# Patient Record
Sex: FEMALE | Race: WHITE | NOT HISPANIC OR LATINO | Employment: UNEMPLOYED | URBAN - METROPOLITAN AREA
[De-identification: names, ages, dates, MRNs, and addresses within clinical notes are randomized per-mention and may not be internally consistent; named-entity substitution may affect disease eponyms.]

---

## 2020-03-17 ENCOUNTER — HOSPITAL ENCOUNTER (OUTPATIENT)
Dept: GENERAL RADIOLOGY | Facility: HOSPITAL | Age: 35
Discharge: HOME | End: 2020-03-17
Attending: OBSTETRICS & GYNECOLOGY

## 2021-03-01 LAB
BACTERIA SPEC AEROBE CULT: ABNORMAL
C TRACH RRNA SPEC DONR QL NAA+PROBE: NORMAL
EXTERNAL ABO GROUPING: NORMAL
EXTERNAL ANTIBODY SCREEN: NORMAL
EXTERNAL HEMATOCRIT: 37 %
EXTERNAL HEMOGLOBIN: 12.5 G/DL
EXTERNAL HEPATITIS B SURFACE ANTIGEN: NEGATIVE
EXTERNAL PLATELET COUNT: 336 K/ΜL
EXTERNAL RH FACTOR: POSITIVE
EXTERNAL SYPHILIS RPR SCREEN: NORMAL
HCV AB S/CO SERPL IA: NON REACTIVE
HIV 1+2 AB+HIV1 P24 AG SERPL QL IA: NORMAL
N GONORRHOEA DNA SPEC QL NAA+PROBE: NORMAL
RUBV IGG SERPL IA-ACNC: >500

## 2021-06-23 LAB
EXTERNAL HEMATOCRIT: 39 %
EXTERNAL HEMOGLOBIN: 12.7 G/DL
GLUCOSE 1H P 100 G GLC PO SERPL-MCNC: 118 MG/DL (ref 74–180)

## 2021-08-28 ENCOUNTER — HOSPITAL ENCOUNTER (OUTPATIENT)
Facility: HOSPITAL | Age: 36
Discharge: HOME | End: 2021-08-28
Attending: OBSTETRICS & GYNECOLOGY | Admitting: OBSTETRICS & GYNECOLOGY

## 2021-08-28 VITALS — SYSTOLIC BLOOD PRESSURE: 107 MMHG | RESPIRATION RATE: 20 BRPM | DIASTOLIC BLOOD PRESSURE: 51 MMHG

## 2021-08-28 PROCEDURE — 59025 FETAL NON-STRESS TEST: CPT

## 2021-09-01 ENCOUNTER — HOSPITAL ENCOUNTER (OUTPATIENT)
Facility: HOSPITAL | Age: 36
Discharge: HOME | End: 2021-09-01
Attending: OBSTETRICS & GYNECOLOGY | Admitting: OBSTETRICS & GYNECOLOGY

## 2021-09-01 VITALS
DIASTOLIC BLOOD PRESSURE: 74 MMHG | HEART RATE: 83 BPM | SYSTOLIC BLOOD PRESSURE: 128 MMHG | OXYGEN SATURATION: 99 % | RESPIRATION RATE: 18 BRPM | TEMPERATURE: 98.6 F

## 2021-09-01 PROCEDURE — 59025 FETAL NON-STRESS TEST: CPT

## 2021-09-01 NOTE — SIGNIFICANT NOTE
09/01/21 1545   Nonstress Test   Reason for NST Other (Comment)  (AMA)   Acoustic Stimulator No   Uterine Irritability Yes   Contractions Not present   Fetal Assessment   Fetal Movement active   Fetal HR Assessment Method external   Fetal HR (beats/min) 145   Fetal Heart Baseline Rate normal range   Fetal HR Variability moderate (amplitude range 6 to 25 bpm)   Fetal HR Accelerations episodic;greater than/equal to 15 bpm;lasting at least 15 seconds   Fetal HR Decelerations absent   Fetal HR Tracing Category Category I   Sinusoidal Pattern Present absent   Interpretation A   Nonstress Test Interpretation A Reactive   34w2d, NST for AMA  /74 (BP Location: Right arm, Patient Position: Sitting)   Pulse 83   Temp 98.6 °F (37 °C) (Oral)   Resp 18   SpO2 99%

## 2021-09-01 NOTE — NON STRESS TEST
OB SCHEDULED NST NOTE        Name:  Nevin Huerta  MRN: [id number]    36 y.o. female  at 34w2d    Indication: AMA    NST:  Baseline: Normal 110-160 bpm  Variability:   Moderate/Normal (amplitude 6-25 bpm)  Accelerations: Present (32 weeks+) 15 x 15 bpm  Decelerations: None  Contractions:  Irritability    Impression:  Category I     Plan: OB Precautions, FKC, Keep scheduled NSTs, Keep office visit      Electronically signed by Jeanie Rojas DO, 21, 5:12 PM EDT.

## 2021-09-04 ENCOUNTER — HOSPITAL ENCOUNTER (OUTPATIENT)
Facility: HOSPITAL | Age: 36
Discharge: HOME | End: 2021-09-04
Attending: OBSTETRICS & GYNECOLOGY | Admitting: OBSTETRICS & GYNECOLOGY

## 2021-09-04 ENCOUNTER — HOSPITAL ENCOUNTER (OUTPATIENT)
Dept: LABOR AND DELIVERY | Facility: HOSPITAL | Age: 36
Setting detail: SURGERY ADMIT
Discharge: HOME | End: 2021-09-04

## 2021-09-04 VITALS — RESPIRATION RATE: 18 BRPM | SYSTOLIC BLOOD PRESSURE: 120 MMHG | HEART RATE: 82 BPM | DIASTOLIC BLOOD PRESSURE: 63 MMHG

## 2021-09-04 PROCEDURE — 59025 FETAL NON-STRESS TEST: CPT

## 2021-09-04 PROCEDURE — 59025 FETAL NON-STRESS TEST: CPT | Performed by: OBSTETRICS & GYNECOLOGY

## 2021-09-04 NOTE — NURSING NOTE
Pt here for an NST due to AMA, NST is reactive but one variable noted with MD priscila at nurses station at this time and reviewed tracing, no concerns at this time, pt may discharge home

## 2021-09-04 NOTE — SIGNIFICANT NOTE
09/04/21 1254   Nonstress Test   Reason for NST Other (Comment)  (AMA)   Acoustic Stimulator No   Contractions Irregular  (Occasional)   Fetal Assessment   Fetal Movement active   Fetal HR Assessment Method external   Fetal HR (beats/min) 140   Fetal Heart Baseline Rate normal range   Fetal HR Variability moderate (amplitude range 6 to 25 bpm)   Fetal HR Accelerations greater than/equal to 15 bpm;lasting at least 15 seconds   Fetal HR Decelerations variable   Sinusoidal Pattern Present absent   Interpretation A   Nonstress Test Interpretation A Reactive   34w5d  /63 (BP Location: Right arm, Patient Position: Sitting)   Pulse 82   Resp 18

## 2021-09-04 NOTE — PROGRESS NOTES
Obstetrical Non-stress Test Interpretation     Name:  Nevin Huerta  MRN: [id number]    36 y.o. female  at 34w5d    Indication: Scheduled NST for AMA    Weeks Gestation: 34w5d       Baseline: 145 BPM  Variability:   Moderate/Normal (amplitude 6-25 bpm)  Accelerations: Present (32 weeks+) 15 x 15 bpm  Decelerations: Absent   Contractions:  Absent      Impression:  Reactive NST,    Plan: Discharge to home.  Keep follow up per office instructions.  Kick counts daily please      George Gloria Sr., MD  2021  12:36 EDT

## 2021-09-08 ENCOUNTER — HOSPITAL ENCOUNTER (OUTPATIENT)
Facility: HOSPITAL | Age: 36
Discharge: HOME | End: 2021-09-08
Attending: OBSTETRICS & GYNECOLOGY | Admitting: OBSTETRICS & GYNECOLOGY

## 2021-09-08 ENCOUNTER — HOSPITAL ENCOUNTER (OUTPATIENT)
Dept: LABOR AND DELIVERY | Facility: HOSPITAL | Age: 36
Discharge: HOME | End: 2021-09-08

## 2021-09-08 VITALS
TEMPERATURE: 98.3 F | DIASTOLIC BLOOD PRESSURE: 74 MMHG | HEART RATE: 86 BPM | BODY MASS INDEX: 42.52 KG/M2 | SYSTOLIC BLOOD PRESSURE: 108 MMHG | HEIGHT: 63 IN | RESPIRATION RATE: 16 BRPM | WEIGHT: 240 LBS

## 2021-09-08 PROCEDURE — 59025 FETAL NON-STRESS TEST: CPT

## 2021-09-08 RX ORDER — OMEPRAZOLE 20 MG/1
20 CAPSULE, DELAYED RELEASE ORAL DAILY
COMMUNITY
End: 2021-09-14 | Stop reason: SDUPTHER

## 2021-09-08 RX ORDER — ASPIRIN 81 MG/1
81 TABLET, CHEWABLE ORAL DAILY
Status: ON HOLD | COMMUNITY
End: 2021-09-22

## 2021-09-08 RX ORDER — PRENATAL VIT/IRON FUM/FOLIC AC 27MG-0.8MG
TABLET ORAL DAILY
COMMUNITY
End: 2021-12-17

## 2021-09-08 NOTE — SIGNIFICANT NOTE
"   09/08/21 1205   Nonstress Test   Reason for NST Other (Comment)  (nst ama)   Acoustic Stimulator No   Uterine Irritability No   Contractions Not present   Fetal Assessment   Fetal Movement active   Fetal HR Assessment Method external   Fetal HR (beats/min) 145   Fetal Heart Baseline Rate normal range   Fetal HR Variability moderate (amplitude range 6 to 25 bpm)   Fetal HR Accelerations greater than/equal to 15 bpm;lasting at least 15 seconds   Fetal HR Decelerations absent   Interpretation A   Nonstress Test Interpretation A Reactive   Patient Vitals for the past 24 hrs:   BP Temp Temp src Pulse Resp Height Weight   09/08/21 1212 108/74 -- -- 86 16 -- --   09/08/21 1140 -- 98.3 °F (36.8 °C) Oral -- -- 160 cm (63\") 109 kg (240 lb)    35w2d   "

## 2021-09-11 ENCOUNTER — HOSPITAL ENCOUNTER (OUTPATIENT)
Facility: HOSPITAL | Age: 36
Discharge: HOME | End: 2021-09-11
Attending: OBSTETRICS & GYNECOLOGY | Admitting: OBSTETRICS & GYNECOLOGY

## 2021-09-11 ENCOUNTER — HOSPITAL ENCOUNTER (OUTPATIENT)
Dept: LABOR AND DELIVERY | Facility: HOSPITAL | Age: 36
Setting detail: SURGERY ADMIT
Discharge: HOME | End: 2021-09-11

## 2021-09-11 VITALS
DIASTOLIC BLOOD PRESSURE: 64 MMHG | TEMPERATURE: 98.7 F | OXYGEN SATURATION: 99 % | SYSTOLIC BLOOD PRESSURE: 133 MMHG | HEART RATE: 82 BPM | RESPIRATION RATE: 18 BRPM

## 2021-09-11 PROCEDURE — 59025 FETAL NON-STRESS TEST: CPT

## 2021-09-11 NOTE — NON STRESS TEST
J[facility]ost   OB NST Note    2021   Name:  Nevin Huerta  MRN: [id number]    Subjective:  36 y.o.  at 35w5d    Indication: Elevated BMI    NST:   Baseline: 140  Variability:   Moderate/Normal (amplitude 6-25 bpm)  Accelerations: Present (32 weeks+) 15 x 15 bpm  Decelerations: Absent   Contractions:  Not regular    NST interpretation: reactive    There were no vitals filed for this visit.      Lab Results (last 24 hours)     ** No results found for the last 24 hours. **           Cervical Exam:    Assessment:  36 y.o.  AT 35w5d  AMA and Elevated BMI    Plan:   OB Precautions, FKC, Keep scheduled NSTs, Keep office visit      Electronically signed by Tim Stanton MD, 21, 1:01 PM EDT.

## 2021-09-14 ENCOUNTER — ROUTINE PRENATAL (OUTPATIENT)
Dept: OBSTETRICS AND GYNECOLOGY | Facility: CLINIC | Age: 36
End: 2021-09-14

## 2021-09-14 VITALS — WEIGHT: 249 LBS | BODY MASS INDEX: 44.11 KG/M2 | SYSTOLIC BLOOD PRESSURE: 118 MMHG | DIASTOLIC BLOOD PRESSURE: 74 MMHG

## 2021-09-14 DIAGNOSIS — Z98.891 PREVIOUS CESAREAN SECTION: ICD-10-CM

## 2021-09-14 DIAGNOSIS — Z3A.36 36 WEEKS GESTATION OF PREGNANCY: ICD-10-CM

## 2021-09-14 DIAGNOSIS — O09.93 HIGH-RISK PREGNANCY IN THIRD TRIMESTER: Primary | ICD-10-CM

## 2021-09-14 DIAGNOSIS — O99.210 OBESITY IN PREGNANCY, ANTEPARTUM: ICD-10-CM

## 2021-09-14 DIAGNOSIS — O09.523 MULTIGRAVIDA OF ADVANCED MATERNAL AGE IN THIRD TRIMESTER: ICD-10-CM

## 2021-09-14 LAB
GLUCOSE UR STRIP-MCNC: NEGATIVE MG/DL
PROT UR STRIP-MCNC: NEGATIVE MG/DL

## 2021-09-14 PROCEDURE — 99213 OFFICE O/P EST LOW 20 MIN: CPT | Performed by: OBSTETRICS & GYNECOLOGY

## 2021-09-14 PROCEDURE — 87081 CULTURE SCREEN ONLY: CPT | Performed by: OBSTETRICS & GYNECOLOGY

## 2021-09-14 RX ORDER — FOLIC ACID 1 MG/1
1 TABLET ORAL DAILY
COMMUNITY
End: 2021-12-17

## 2021-09-14 NOTE — PROGRESS NOTES
OB FOLLOW UP    CC: Scheduled OB routine FU      Prenatal care complicated by: Elevated BMI, AMA and Prior   Patient Active Problem List   Diagnosis   • 36 weeks gestation of pregnancy   • Multigravida of advanced maternal age in third trimester   • High-risk pregnancy in third trimester   • Previous  section   • Obesity in pregnancy, antepartum       Subjective:   Patient has: No leaking fluid, No vaginal bleeding, Adequate FM, No HA, No scotomata or vision changes, No RUQ/epigastric pain, No nausea, No vomiting, No back pain, No UTI symptoms  COMPLAINS OF: Cramping Occasionally and Swelling    Objective:  Urine glucose/protein- see flow sheet      /74   Wt 113 kg (249 lb)   LMP 2020 (Approximate)   BMI 44.11 kg/m²   See OB flow for LE edema, and cvx exam if applicable  FHT: 144 BPM   Uterine Size: 37 cm      Assessment and Plan:  Diagnoses and all orders for this visit:    1. High-risk pregnancy in third trimester (Primary)    2. 36 weeks gestation of pregnancy  Comments:  Anticipatory guidance  Labor precautions, fetal kick counting  Orders:  -     Group B Streptococcus Culture - Swab, Vaginal/Rectum  -     POC Urinalysis Dipstick    3. Multigravida of advanced maternal age in third trimester  Comments:  Continue nonstress tests  Delivery at 39th week of gestation    4. Previous  section  Comments:  Repeat  section scheduled, risk, benefits, alternatives discussed      5. Obesity in pregnancy, antepartum  Comments:  Monitor weight  Overview:  Monitor weight            36w1d  Reassuring pregnancy progress    Counseling: OB precautions, leaking, VB, august cardenas vs PTL/Labor, FKC, HTN precautions, HA, vision change, RUQ/epigastric pain, edema    Questions answered    Return in about 1 week (around 2021).      Tim Stanton MD  2021

## 2021-09-15 ENCOUNTER — HOSPITAL ENCOUNTER (OUTPATIENT)
Facility: HOSPITAL | Age: 36
Discharge: HOME | End: 2021-09-15
Attending: OBSTETRICS & GYNECOLOGY | Admitting: OBSTETRICS & GYNECOLOGY

## 2021-09-15 VITALS
DIASTOLIC BLOOD PRESSURE: 70 MMHG | RESPIRATION RATE: 18 BRPM | SYSTOLIC BLOOD PRESSURE: 118 MMHG | TEMPERATURE: 98.3 F | HEART RATE: 86 BPM

## 2021-09-15 PROCEDURE — 59025 FETAL NON-STRESS TEST: CPT

## 2021-09-15 NOTE — SIGNIFICANT NOTE
09/15/21 1644   Nonstress Test   Reason for NST Other (Comment)  (AMA)   Acoustic Stimulator No   Uterine Irritability No   Contractions Irregular   Fetal Assessment   Fetal Movement active   Fetal HR Assessment Method external   Fetal HR (beats/min) 145   Fetal Heart Baseline Rate normal range   Fetal HR Variability moderate (amplitude range 6 to 25 bpm)   Fetal HR Accelerations greater than/equal to 15 bpm;lasting at least 15 seconds   Fetal HR Decelerations absent   Fetal HR Tracing Category Category I   Sinusoidal Pattern Present absent   Interpretation A   Nonstress Test Interpretation A Reactive   36w2d  /70 (BP Location: Right arm, Patient Position: Lying)   Pulse 86   Temp 98.3 °F (36.8 °C) (Oral)   Resp 18   LMP 12/25/2020 (Approximate)

## 2021-09-15 NOTE — NON STRESS TEST
OB SCHEDULED NST NOTE        Name:  Nevin Huerta  MRN: [id number]    36 y.o. female  at 36w2d    Indication: AMA    NST:  Baseline: Normal 110-160 bpm  Variability:   Moderate/Normal (amplitude 6-25 bpm)  Accelerations: Present (32 weeks+) 15 x 15 bpm  Decelerations: None  Contractions:  Absent  Duration: see nursing documentation    Impression:  Category I     Plan: OB Precautions, FKC, Keep scheduled NSTs, Keep office visit      Electronically signed by Jeanie Rojas DO, 09/15/21, 6:56 PM EDT.

## 2021-09-16 LAB — BACTERIA SPEC AEROBE CULT: NORMAL

## 2021-09-18 ENCOUNTER — HOSPITAL ENCOUNTER (OUTPATIENT)
Dept: LABOR AND DELIVERY | Facility: HOSPITAL | Age: 36
Setting detail: SURGERY ADMIT
Discharge: HOME | End: 2021-09-18

## 2021-09-18 ENCOUNTER — HOSPITAL ENCOUNTER (OUTPATIENT)
Facility: HOSPITAL | Age: 36
Discharge: HOME | End: 2021-09-18
Attending: OBSTETRICS & GYNECOLOGY | Admitting: OBSTETRICS & GYNECOLOGY

## 2021-09-18 VITALS
RESPIRATION RATE: 18 BRPM | OXYGEN SATURATION: 98 % | SYSTOLIC BLOOD PRESSURE: 115 MMHG | HEART RATE: 79 BPM | DIASTOLIC BLOOD PRESSURE: 46 MMHG

## 2021-09-18 PROCEDURE — 59025 FETAL NON-STRESS TEST: CPT

## 2021-09-18 NOTE — SIGNIFICANT NOTE
36w5d  /46 (BP Location: Right arm, Patient Position: Sitting)   Pulse 79   Resp 18   LMP 12/25/2020 (Approximate)   SpO2 98%     Patient arrived to L&D triage for NST for AMA. Upon arrival to triage area patient slid in water on floor. No injuries noted. Patient slid to knees. Denies hitting belly, Is feeling fetal movement. Incident report completed.        09/18/21 1218   Nonstress Test   Reason for NST Other (Comment)  (AMA, PATIENT FELL ON ARRIVAL FOR NST IN TRIAGE ROOM.)   Acoustic Stimulator No   Uterine Irritability No   Contractions Not present   Fetal Assessment   Fetal Movement active   Fetal HR Assessment Method external   Fetal HR (beats/min) 140   Fetal Heart Baseline Rate normal range   Fetal HR Variability moderate (amplitude range 6 to 25 bpm)   Fetal HR Accelerations greater than/equal to 15 bpm;lasting at least 15 seconds   Fetal HR Decelerations variable   Interpretation A   Nonstress Test Interpretation A Reactive

## 2021-09-18 NOTE — NON STRESS TEST
[facility]   Obstetrical Non-Stress Test Interpretation    Patient Name: Nevin Huerta  : 1985  MRN: [id number]  Primary Care Physician:  Trinh Morse APRN  Date of admission: 2021      36 y.o. female  at 36w5d    Indication: AMA    Patient slipped in [facility] restroom when arriving for NST today and went down to her knees. Did not hit abdomen, denies any discomfort, and is feeling good fetal movement.      Baseline: Normal 110-160 bpm  Variability:   Moderate/Normal (amplitude 6-25 bpm)  Accelerations: Present (32 weeks+) 15 x 15 bpm  Decelerations: Present:  Variable decelerations, 1 isolated variable  Contractions:  Present       Impression:  Reactive      Valentina Bronson CNM  2021  12:22 EDT

## 2021-09-20 ENCOUNTER — HOSPITAL ENCOUNTER (OUTPATIENT)
Facility: HOSPITAL | Age: 36
Discharge: HOME | End: 2021-09-20
Attending: STUDENT IN AN ORGANIZED HEALTH CARE EDUCATION/TRAINING PROGRAM | Admitting: STUDENT IN AN ORGANIZED HEALTH CARE EDUCATION/TRAINING PROGRAM

## 2021-09-20 VITALS
RESPIRATION RATE: 18 BRPM | HEART RATE: 99 BPM | DIASTOLIC BLOOD PRESSURE: 59 MMHG | SYSTOLIC BLOOD PRESSURE: 114 MMHG | OXYGEN SATURATION: 98 % | TEMPERATURE: 98.1 F

## 2021-09-20 PROCEDURE — 59025 FETAL NON-STRESS TEST: CPT

## 2021-09-20 NOTE — SIGNIFICANT NOTE
37w0d  /59 (BP Location: Right arm, Patient Position: Sitting)   Pulse 99   Temp 98.1 °F (36.7 °C) (Oral)   Resp 18   LMP 12/25/2020 (Approximate)   SpO2 98%        09/20/21 1335   Nonstress Test   Reason for NST OB Triage;Decreased fetal movement   Fetal Assessment   Fetal Movement active   Fetal HR Assessment Method external   Fetal HR (beats/min) 150   Fetal Heart Baseline Rate normal range   Fetal HR Variability moderate (amplitude range 6 to 25 bpm)   Fetal HR Accelerations greater than/equal to 15 bpm;lasting at least 15 seconds   Interpretation A   Nonstress Test Interpretation A Reactive

## 2021-09-21 ENCOUNTER — ROUTINE PRENATAL (OUTPATIENT)
Dept: OBSTETRICS AND GYNECOLOGY | Facility: CLINIC | Age: 36
End: 2021-09-21

## 2021-09-21 VITALS — WEIGHT: 252 LBS | BODY MASS INDEX: 44.64 KG/M2 | DIASTOLIC BLOOD PRESSURE: 77 MMHG | SYSTOLIC BLOOD PRESSURE: 114 MMHG

## 2021-09-21 DIAGNOSIS — O99.210 OBESITY IN PREGNANCY, ANTEPARTUM: ICD-10-CM

## 2021-09-21 DIAGNOSIS — Z3A.37 37 WEEKS GESTATION OF PREGNANCY: ICD-10-CM

## 2021-09-21 DIAGNOSIS — O09.93 HIGH-RISK PREGNANCY IN THIRD TRIMESTER: Primary | ICD-10-CM

## 2021-09-21 DIAGNOSIS — Z30.09 UNWANTED FERTILITY: ICD-10-CM

## 2021-09-21 DIAGNOSIS — K21.9 GASTROESOPHAGEAL REFLUX DISEASE WITHOUT ESOPHAGITIS: ICD-10-CM

## 2021-09-21 DIAGNOSIS — O09.523 MULTIGRAVIDA OF ADVANCED MATERNAL AGE IN THIRD TRIMESTER: ICD-10-CM

## 2021-09-21 DIAGNOSIS — Z98.891 PREVIOUS CESAREAN SECTION: ICD-10-CM

## 2021-09-21 PROBLEM — O09.40 ENCOUNTER FOR SUPERVISION OF HIGH RISK PREGNANCY WITH GRAND MULTIPARITY, ANTEPARTUM: Status: ACTIVE | Noted: 2021-09-21

## 2021-09-21 PROBLEM — Z34.80 SUPERVISION OF OTHER NORMAL PREGNANCY, ANTEPARTUM: Status: ACTIVE | Noted: 2021-09-21

## 2021-09-21 LAB
GLUCOSE UR STRIP-MCNC: NEGATIVE MG/DL
PROT UR STRIP-MCNC: ABNORMAL MG/DL

## 2021-09-21 PROCEDURE — 99213 OFFICE O/P EST LOW 20 MIN: CPT | Performed by: OBSTETRICS & GYNECOLOGY

## 2021-09-21 NOTE — ASSESSMENT & PLAN NOTE
Risks, benefits, alternatives of repeat  delivery with bilateral tubal ligation discussed.  Patient wishes to proceed.  Surgery has been scheduled.

## 2021-09-21 NOTE — PROGRESS NOTES
OB FOLLOW UP    CC: Scheduled OB routine FU     Prenatal care complicated by: Elevated BMI- pre-pregnancy, AMA, Prior   Patient Active Problem List   Diagnosis   • 37 weeks gestation of pregnancy   • Multigravida of advanced maternal age in third trimester   • High-risk pregnancy in third trimester   • Previous  section   • Gastroesophageal reflux disease without esophagitis   • Unwanted fertility       Subjective:   Patient has: No complaints, No leaking fluid, No vaginal bleeding, Adequate FM, No HA, No scotomata or vision changes, No RUQ/epigastric pain, No nausea, No vomiting  Reports some contractions yesterday.  Only occasional ones today.  Does report some lower extremity swelling    Objective:  Urine glucose/protein- see flow sheet      /77   Wt 114 kg (252 lb)   LMP 2020 (LMP Unknown)   BMI 44.64 kg/m²   See OB flow for LE edema, and cvx exam if applicable  FHT: 140 BPM   Uterine Size: 38 cm      Assessment and Plan:  Diagnoses and all orders for this visit:    1. High-risk pregnancy in third trimester (Primary)  -     POC Urinalysis Dipstick    2. 37 weeks gestation of pregnancy    3. Previous  section  Overview:  Repeat  delivery on 10/4 with BTL    Assessment & Plan:  Risks, benefits, alternatives of repeat  delivery with bilateral tubal ligation discussed.  Patient wishes to proceed.  Surgery has been scheduled.      4. Obesity in pregnancy, antepartum  Overview:  Monitor weight      5. Multigravida of advanced maternal age in third trimester  Overview:  NSTs biweekly    Assessment & Plan:  Continue NSTs until delivery      6. Unwanted fertility  Overview:  Sterilization consent signed    Assessment & Plan:  Risks, benefits, alternatives of permanent sterilization reviewed.  Patient wishes to proceed      7. Gastroesophageal reflux disease without esophagitis  Overview:  Nexium 20 mg daily    Assessment & Plan:  Symptoms controlled.  Continue  Nexium      37w1d  Reassuring pregnancy progress    Counseling: OB precautions, leaking, VB, august cardenas vs PTL/Labor  [facility]    Questions answered    Return in about 1 week (around 9/28/2021) for Next scheduled follow up.      Tim Stanton MD  09/21/2021

## 2021-09-22 ENCOUNTER — HOSPITAL ENCOUNTER (OUTPATIENT)
Dept: LABOR AND DELIVERY | Facility: HOSPITAL | Age: 36
Setting detail: SURGERY ADMIT
Discharge: HOME | End: 2021-09-22

## 2021-09-22 ENCOUNTER — HOSPITAL ENCOUNTER (INPATIENT)
Facility: HOSPITAL | Age: 36
LOS: 2 days | Discharge: HOME | End: 2021-09-24
Attending: OBSTETRICS & GYNECOLOGY | Admitting: OBSTETRICS & GYNECOLOGY

## 2021-09-22 ENCOUNTER — ANESTHESIA EVENT (OUTPATIENT)
Dept: LABOR AND DELIVERY | Facility: HOSPITAL | Age: 36
End: 2021-09-22

## 2021-09-22 ENCOUNTER — ANESTHESIA (OUTPATIENT)
Dept: LABOR AND DELIVERY | Facility: HOSPITAL | Age: 36
End: 2021-09-22

## 2021-09-22 DIAGNOSIS — Z98.891 H/O: C-SECTION: Primary | ICD-10-CM

## 2021-09-22 PROBLEM — E66.01 MORBID OBESITY WITH BMI OF 40.0-44.9, ADULT: Status: ACTIVE | Noted: 2021-09-22

## 2021-09-22 PROBLEM — O36.8390 NON-REASSURING ELECTRONIC FETAL MONITORING TRACING: Status: ACTIVE | Noted: 2021-09-22

## 2021-09-22 LAB
ABO GROUP BLD: NORMAL
ABO GROUP BLD: NORMAL
BASE EXCESS BLDCOA CALC-SCNC: -3 MMOL/L
BASE EXCESS BLDCOV CALC-SCNC: -1.8 MMOL/L
BLD GP AB SCN SERPL QL: NEGATIVE
DEPRECATED RDW RBC AUTO: 43.6 FL (ref 37–54)
ERYTHROCYTE [DISTWIDTH] IN BLOOD BY AUTOMATED COUNT: 13.8 % (ref 12.3–15.4)
HCO3 BLDCOA-SCNC: 24.8 MMOL/L
HCO3 BLDCOV-SCNC: 24.9 MMOL/L
HCT VFR BLD AUTO: 37.4 % (ref 34–46.6)
HGB BLD-MCNC: 12.7 G/DL (ref 12–15.9)
MCH RBC QN AUTO: 29.5 PG (ref 26.6–33)
MCHC RBC AUTO-ENTMCNC: 34 G/DL (ref 31.5–35.7)
MCV RBC AUTO: 87 FL (ref 79–97)
PCO2 BLDCOA: 55.6 MMHG (ref 33–49)
PCO2 BLDCOV: 50.1 MM HG (ref 28–40)
PH BLDCOA: 7.27 PH UNITS (ref 7.21–7.31)
PH BLDCOV: 7.32 PH UNITS (ref 7.31–7.37)
PLATELET # BLD AUTO: 252 10*3/MM3 (ref 140–450)
PMV BLD AUTO: 11.1 FL (ref 6–12)
PO2 BLDCOA: <40.5 MMHG
PO2 BLDCOV: <40.5 MM HG (ref 21–31)
RBC # BLD AUTO: 4.3 10*6/MM3 (ref 3.77–5.28)
RH BLD: POSITIVE
RH BLD: POSITIVE
SARS-COV-2 N GENE RESP QL NAA+PROBE: NOT DETECTED
T&S EXPIRATION DATE: NORMAL
WBC # BLD AUTO: 8.33 10*3/MM3 (ref 3.4–10.8)

## 2021-09-22 PROCEDURE — C9803 HOPD COVID-19 SPEC COLLECT: HCPCS | Performed by: OBSTETRICS & GYNECOLOGY

## 2021-09-22 PROCEDURE — 87635 SARS-COV-2 COVID-19 AMP PRB: CPT | Performed by: OBSTETRICS & GYNECOLOGY

## 2021-09-22 PROCEDURE — 86900 BLOOD TYPING SEROLOGIC ABO: CPT | Performed by: OBSTETRICS & GYNECOLOGY

## 2021-09-22 PROCEDURE — 25010000002 METOCLOPRAMIDE PER 10 MG: Performed by: OBSTETRICS & GYNECOLOGY

## 2021-09-22 PROCEDURE — 86900 BLOOD TYPING SEROLOGIC ABO: CPT

## 2021-09-22 PROCEDURE — 86901 BLOOD TYPING SEROLOGIC RH(D): CPT

## 2021-09-22 PROCEDURE — 25010000003 CEFAZOLIN IN DEXTROSE 2-4 GM/100ML-% SOLUTION: Performed by: OBSTETRICS & GYNECOLOGY

## 2021-09-22 PROCEDURE — 86901 BLOOD TYPING SEROLOGIC RH(D): CPT | Performed by: OBSTETRICS & GYNECOLOGY

## 2021-09-22 PROCEDURE — 59515 CESAREAN DELIVERY: CPT | Performed by: OBSTETRICS & GYNECOLOGY

## 2021-09-22 PROCEDURE — C1889 IMPLANT/INSERT DEVICE, NOC: HCPCS | Performed by: OBSTETRICS & GYNECOLOGY

## 2021-09-22 PROCEDURE — 51702 INSERT TEMP BLADDER CATH: CPT

## 2021-09-22 PROCEDURE — 58611 LIGATE OVIDUCT(S) ADD-ON: CPT | Performed by: OBSTETRICS & GYNECOLOGY

## 2021-09-22 PROCEDURE — 25010000002 KETOROLAC TROMETHAMINE PER 15 MG: Performed by: NURSE ANESTHETIST, CERTIFIED REGISTERED

## 2021-09-22 PROCEDURE — 25010000002 KETOROLAC TROMETHAMINE PER 15 MG: Performed by: OBSTETRICS & GYNECOLOGY

## 2021-09-22 PROCEDURE — 85027 COMPLETE CBC AUTOMATED: CPT | Performed by: OBSTETRICS & GYNECOLOGY

## 2021-09-22 PROCEDURE — 0UL70CZ OCCLUSION OF BILATERAL FALLOPIAN TUBES WITH EXTRALUMINAL DEVICE, OPEN APPROACH: ICD-10-PCS | Performed by: OBSTETRICS & GYNECOLOGY

## 2021-09-22 PROCEDURE — 82803 BLOOD GASES ANY COMBINATION: CPT | Performed by: OBSTETRICS & GYNECOLOGY

## 2021-09-22 PROCEDURE — 25010000002 ONDANSETRON PER 1 MG: Performed by: NURSE ANESTHETIST, CERTIFIED REGISTERED

## 2021-09-22 PROCEDURE — 86850 RBC ANTIBODY SCREEN: CPT | Performed by: OBSTETRICS & GYNECOLOGY

## 2021-09-22 PROCEDURE — 25010000003 MORPHINE PER 10 MG: Performed by: ANESTHESIOLOGY

## 2021-09-22 DEVICE — CLIP FALLOP FILSHIE TI PR STRL BX/20: Type: IMPLANTABLE DEVICE | Status: FUNCTIONAL

## 2021-09-22 RX ORDER — SODIUM CHLORIDE 0.9 % (FLUSH) 0.9 %
3 SYRINGE (ML) INJECTION EVERY 12 HOURS SCHEDULED
Status: DISCONTINUED | OUTPATIENT
Start: 2021-09-22 | End: 2021-09-22

## 2021-09-22 RX ORDER — CARBOPROST TROMETHAMINE 250 UG/ML
250 INJECTION, SOLUTION INTRAMUSCULAR AS NEEDED
Status: DISCONTINUED | OUTPATIENT
Start: 2021-09-22 | End: 2021-09-22

## 2021-09-22 RX ORDER — METHYLERGONOVINE MALEATE 0.2 MG/ML
200 INJECTION INTRAVENOUS ONCE AS NEEDED
Status: DISCONTINUED | OUTPATIENT
Start: 2021-09-22 | End: 2021-09-22

## 2021-09-22 RX ORDER — ONDANSETRON 2 MG/ML
4 INJECTION INTRAMUSCULAR; INTRAVENOUS EVERY 6 HOURS PRN
Status: DISCONTINUED | OUTPATIENT
Start: 2021-09-22 | End: 2021-09-24 | Stop reason: HOSPADM

## 2021-09-22 RX ORDER — ONDANSETRON 2 MG/ML
INJECTION INTRAMUSCULAR; INTRAVENOUS AS NEEDED
Status: DISCONTINUED | OUTPATIENT
Start: 2021-09-22 | End: 2021-09-22 | Stop reason: SURG

## 2021-09-22 RX ORDER — BUPIVACAINE HYDROCHLORIDE 7.5 MG/ML
INJECTION, SOLUTION EPIDURAL; RETROBULBAR
Status: COMPLETED | OUTPATIENT
Start: 2021-09-22 | End: 2021-09-22

## 2021-09-22 RX ORDER — HYDROCORTISONE 25 MG/G
CREAM TOPICAL 3 TIMES DAILY PRN
Status: DISCONTINUED | OUTPATIENT
Start: 2021-09-22 | End: 2021-09-24 | Stop reason: HOSPADM

## 2021-09-22 RX ORDER — ONDANSETRON 4 MG/1
4 TABLET, FILM COATED ORAL EVERY 6 HOURS PRN
Status: DISCONTINUED | OUTPATIENT
Start: 2021-09-22 | End: 2021-09-22 | Stop reason: HOSPADM

## 2021-09-22 RX ORDER — IBUPROFEN 800 MG/1
800 TABLET ORAL EVERY 8 HOURS
Status: DISCONTINUED | OUTPATIENT
Start: 2021-09-24 | End: 2021-09-24 | Stop reason: HOSPADM

## 2021-09-22 RX ORDER — CEFAZOLIN SODIUM IN 0.9 % NACL 3 G/100 ML
3 INTRAVENOUS SOLUTION, PIGGYBACK (ML) INTRAVENOUS ONCE AS NEEDED
Status: DISCONTINUED | OUTPATIENT
Start: 2021-09-22 | End: 2021-09-22

## 2021-09-22 RX ORDER — PROMETHAZINE HYDROCHLORIDE 25 MG/1
25 TABLET ORAL ONCE AS NEEDED
Status: DISCONTINUED | OUTPATIENT
Start: 2021-09-22 | End: 2021-09-24 | Stop reason: HOSPADM

## 2021-09-22 RX ORDER — PROMETHAZINE HYDROCHLORIDE 25 MG/1
25 SUPPOSITORY RECTAL ONCE AS NEEDED
Status: DISCONTINUED | OUTPATIENT
Start: 2021-09-22 | End: 2021-09-24 | Stop reason: HOSPADM

## 2021-09-22 RX ORDER — FAMOTIDINE 10 MG/ML
20 INJECTION, SOLUTION INTRAVENOUS ONCE AS NEEDED
Status: COMPLETED | OUTPATIENT
Start: 2021-09-22 | End: 2021-09-22

## 2021-09-22 RX ORDER — ONDANSETRON 4 MG/1
4 TABLET, FILM COATED ORAL EVERY 6 HOURS PRN
Status: DISCONTINUED | OUTPATIENT
Start: 2021-09-22 | End: 2021-09-24 | Stop reason: HOSPADM

## 2021-09-22 RX ORDER — FAMOTIDINE 10 MG/ML
20 INJECTION, SOLUTION INTRAVENOUS ONCE AS NEEDED
Status: DISCONTINUED | OUTPATIENT
Start: 2021-09-22 | End: 2021-09-22 | Stop reason: HOSPADM

## 2021-09-22 RX ORDER — DOCUSATE SODIUM 100 MG/1
100 CAPSULE, LIQUID FILLED ORAL DAILY
Status: DISCONTINUED | OUTPATIENT
Start: 2021-09-22 | End: 2021-09-24 | Stop reason: HOSPADM

## 2021-09-22 RX ORDER — MISOPROSTOL 200 UG/1
800 TABLET ORAL AS NEEDED
Status: DISCONTINUED | OUTPATIENT
Start: 2021-09-22 | End: 2021-09-22

## 2021-09-22 RX ORDER — SODIUM CHLORIDE 0.9 % (FLUSH) 0.9 %
10 SYRINGE (ML) INJECTION AS NEEDED
Status: DISCONTINUED | OUTPATIENT
Start: 2021-09-22 | End: 2021-09-22

## 2021-09-22 RX ORDER — TRISODIUM CITRATE DIHYDRATE AND CITRIC ACID MONOHYDRATE 500; 334 MG/5ML; MG/5ML
30 SOLUTION ORAL ONCE
Status: COMPLETED | OUTPATIENT
Start: 2021-09-22 | End: 2021-09-22

## 2021-09-22 RX ORDER — MISOPROSTOL 200 UG/1
TABLET ORAL AS NEEDED
Status: DISCONTINUED | OUTPATIENT
Start: 2021-09-22 | End: 2021-09-24 | Stop reason: HOSPADM

## 2021-09-22 RX ORDER — OXYTOCIN 10 [USP'U]/ML
INJECTION, SOLUTION INTRAMUSCULAR; INTRAVENOUS AS NEEDED
Status: DISCONTINUED | OUTPATIENT
Start: 2021-09-22 | End: 2021-09-22 | Stop reason: SURG

## 2021-09-22 RX ORDER — KETOROLAC TROMETHAMINE 30 MG/ML
INJECTION, SOLUTION INTRAMUSCULAR; INTRAVENOUS AS NEEDED
Status: DISCONTINUED | OUTPATIENT
Start: 2021-09-22 | End: 2021-09-22 | Stop reason: SURG

## 2021-09-22 RX ORDER — LIDOCAINE HYDROCHLORIDE 10 MG/ML
5 INJECTION, SOLUTION EPIDURAL; INFILTRATION; INTRACAUDAL; PERINEURAL AS NEEDED
Status: DISCONTINUED | OUTPATIENT
Start: 2021-09-22 | End: 2021-09-22

## 2021-09-22 RX ORDER — OXYTOCIN-SODIUM CHLORIDE 0.9% IV SOLN 30 UNIT/500ML 30-0.9/5 UT/ML-%
125 SOLUTION INTRAVENOUS ONCE
Status: DISCONTINUED | OUTPATIENT
Start: 2021-09-22 | End: 2021-09-22 | Stop reason: HOSPADM

## 2021-09-22 RX ORDER — FAMOTIDINE 20 MG/1
20 TABLET, FILM COATED ORAL ONCE AS NEEDED
Status: DISCONTINUED | OUTPATIENT
Start: 2021-09-22 | End: 2021-09-22 | Stop reason: HOSPADM

## 2021-09-22 RX ORDER — MEPERIDINE HYDROCHLORIDE 25 MG/ML
12.5 INJECTION INTRAMUSCULAR; INTRAVENOUS; SUBCUTANEOUS
Status: ACTIVE | OUTPATIENT
Start: 2021-09-22 | End: 2021-09-23

## 2021-09-22 RX ORDER — OXYCODONE HYDROCHLORIDE 5 MG/1
5 TABLET ORAL
Status: DISCONTINUED | OUTPATIENT
Start: 2021-09-22 | End: 2021-09-24 | Stop reason: HOSPADM

## 2021-09-22 RX ORDER — METOCLOPRAMIDE HYDROCHLORIDE 5 MG/ML
10 INJECTION INTRAMUSCULAR; INTRAVENOUS ONCE AS NEEDED
Status: COMPLETED | OUTPATIENT
Start: 2021-09-22 | End: 2021-09-22

## 2021-09-22 RX ORDER — CEFAZOLIN SODIUM 2 G/100ML
2 INJECTION, SOLUTION INTRAVENOUS ONCE
Status: COMPLETED | OUTPATIENT
Start: 2021-09-22 | End: 2021-09-22

## 2021-09-22 RX ORDER — HYDROCODONE BITARTRATE AND ACETAMINOPHEN 10; 325 MG/1; MG/1
1 TABLET ORAL EVERY 4 HOURS PRN
Status: DISCONTINUED | OUTPATIENT
Start: 2021-09-23 | End: 2021-09-24 | Stop reason: HOSPADM

## 2021-09-22 RX ORDER — HYDROMORPHONE HCL 110MG/55ML
0.5 PATIENT CONTROLLED ANALGESIA SYRINGE INTRAVENOUS
Status: DISCONTINUED | OUTPATIENT
Start: 2021-09-22 | End: 2021-09-24 | Stop reason: HOSPADM

## 2021-09-22 RX ORDER — MISOPROSTOL 200 UG/1
200 TABLET ORAL AS NEEDED
Status: DISCONTINUED | OUTPATIENT
Start: 2021-09-22 | End: 2021-09-24 | Stop reason: HOSPADM

## 2021-09-22 RX ORDER — SIMETHICONE 80 MG
80 TABLET,CHEWABLE ORAL 4 TIMES DAILY PRN
Status: DISCONTINUED | OUTPATIENT
Start: 2021-09-22 | End: 2021-09-24 | Stop reason: HOSPADM

## 2021-09-22 RX ORDER — ACETAMINOPHEN 325 MG/1
650 TABLET ORAL EVERY 6 HOURS
Status: DISCONTINUED | OUTPATIENT
Start: 2021-09-22 | End: 2021-09-24 | Stop reason: HOSPADM

## 2021-09-22 RX ORDER — EPHEDRINE SULFATE 50 MG/ML
INJECTION, SOLUTION INTRAVENOUS AS NEEDED
Status: DISCONTINUED | OUTPATIENT
Start: 2021-09-22 | End: 2021-09-22 | Stop reason: SURG

## 2021-09-22 RX ORDER — HYDROMORPHONE HCL 110MG/55ML
0.25 PATIENT CONTROLLED ANALGESIA SYRINGE INTRAVENOUS
Status: DISCONTINUED | OUTPATIENT
Start: 2021-09-22 | End: 2021-09-24 | Stop reason: HOSPADM

## 2021-09-22 RX ORDER — SODIUM CHLORIDE, SODIUM LACTATE, POTASSIUM CHLORIDE, CALCIUM CHLORIDE 600; 310; 30; 20 MG/100ML; MG/100ML; MG/100ML; MG/100ML
150 INJECTION, SOLUTION INTRAVENOUS CONTINUOUS
Status: DISCONTINUED | OUTPATIENT
Start: 2021-09-22 | End: 2021-09-22

## 2021-09-22 RX ORDER — KETOROLAC TROMETHAMINE 30 MG/ML
30 INJECTION, SOLUTION INTRAMUSCULAR; INTRAVENOUS EVERY 6 HOURS
Status: COMPLETED | OUTPATIENT
Start: 2021-09-22 | End: 2021-09-23

## 2021-09-22 RX ORDER — METHYLERGONOVINE MALEATE 0.2 MG/ML
200 INJECTION INTRAVENOUS ONCE AS NEEDED
Status: DISCONTINUED | OUTPATIENT
Start: 2021-09-22 | End: 2021-09-24 | Stop reason: HOSPADM

## 2021-09-22 RX ORDER — SODIUM CHLORIDE, SODIUM LACTATE, POTASSIUM CHLORIDE, CALCIUM CHLORIDE 600; 310; 30; 20 MG/100ML; MG/100ML; MG/100ML; MG/100ML
INJECTION, SOLUTION INTRAVENOUS CONTINUOUS PRN
Status: DISCONTINUED | OUTPATIENT
Start: 2021-09-22 | End: 2021-09-22 | Stop reason: SURG

## 2021-09-22 RX ORDER — MORPHINE SULFATE 0.5 MG/ML
INJECTION, SOLUTION EPIDURAL; INTRATHECAL; INTRAVENOUS
Status: COMPLETED | OUTPATIENT
Start: 2021-09-22 | End: 2021-09-22

## 2021-09-22 RX ORDER — ONDANSETRON 2 MG/ML
4 INJECTION INTRAMUSCULAR; INTRAVENOUS ONCE AS NEEDED
Status: DISCONTINUED | OUTPATIENT
Start: 2021-09-22 | End: 2021-09-24 | Stop reason: HOSPADM

## 2021-09-22 RX ORDER — SODIUM CHLORIDE, SODIUM LACTATE, POTASSIUM CHLORIDE, CALCIUM CHLORIDE 600; 310; 30; 20 MG/100ML; MG/100ML; MG/100ML; MG/100ML
150 INJECTION, SOLUTION INTRAVENOUS CONTINUOUS
Status: DISCONTINUED | OUTPATIENT
Start: 2021-09-22 | End: 2021-09-24 | Stop reason: HOSPADM

## 2021-09-22 RX ORDER — CARBOPROST TROMETHAMINE 250 UG/ML
250 INJECTION, SOLUTION INTRAMUSCULAR AS NEEDED
Status: DISCONTINUED | OUTPATIENT
Start: 2021-09-22 | End: 2021-09-24 | Stop reason: HOSPADM

## 2021-09-22 RX ORDER — ONDANSETRON 2 MG/ML
4 INJECTION INTRAMUSCULAR; INTRAVENOUS EVERY 6 HOURS PRN
Status: DISCONTINUED | OUTPATIENT
Start: 2021-09-22 | End: 2021-09-22 | Stop reason: HOSPADM

## 2021-09-22 RX ORDER — HYDROCODONE BITARTRATE AND ACETAMINOPHEN 5; 325 MG/1; MG/1
1 TABLET ORAL EVERY 4 HOURS PRN
Status: DISCONTINUED | OUTPATIENT
Start: 2021-09-23 | End: 2021-09-24 | Stop reason: HOSPADM

## 2021-09-22 RX ADMIN — KETOROLAC TROMETHAMINE 30 MG: 30 INJECTION, SOLUTION INTRAMUSCULAR; INTRAVENOUS at 18:27

## 2021-09-22 RX ADMIN — DOCUSATE SODIUM 100 MG: 100 CAPSULE, LIQUID FILLED ORAL at 20:10

## 2021-09-22 RX ADMIN — SODIUM CHLORIDE, POTASSIUM CHLORIDE, SODIUM LACTATE AND CALCIUM CHLORIDE 1000 ML: 600; 310; 30; 20 INJECTION, SOLUTION INTRAVENOUS at 22:43

## 2021-09-22 RX ADMIN — METOCLOPRAMIDE HYDROCHLORIDE 10 MG: 5 INJECTION INTRAMUSCULAR; INTRAVENOUS at 15:01

## 2021-09-22 RX ADMIN — CEFAZOLIN SODIUM 2 G: 2 INJECTION, SOLUTION INTRAVENOUS at 14:59

## 2021-09-22 RX ADMIN — FAMOTIDINE 20 MG: 10 INJECTION INTRAVENOUS at 15:01

## 2021-09-22 RX ADMIN — SODIUM CHLORIDE, POTASSIUM CHLORIDE, SODIUM LACTATE AND CALCIUM CHLORIDE: 600; 310; 30; 20 INJECTION, SOLUTION INTRAVENOUS at 15:40

## 2021-09-22 RX ADMIN — ACETAMINOPHEN 650 MG: 325 TABLET, FILM COATED ORAL at 20:09

## 2021-09-22 RX ADMIN — OXYTOCIN 20 UNITS: 10 INJECTION INTRAVENOUS at 16:27

## 2021-09-22 RX ADMIN — BUPIVACAINE HYDROCHLORIDE 1.6 ML: 7.5 INJECTION, SOLUTION EPIDURAL; RETROBULBAR at 15:56

## 2021-09-22 RX ADMIN — SODIUM CHLORIDE, POTASSIUM CHLORIDE, SODIUM LACTATE AND CALCIUM CHLORIDE: 600; 310; 30; 20 INJECTION, SOLUTION INTRAVENOUS at 16:57

## 2021-09-22 RX ADMIN — SODIUM CHLORIDE, POTASSIUM CHLORIDE, SODIUM LACTATE AND CALCIUM CHLORIDE 150 ML/HR: 600; 310; 30; 20 INJECTION, SOLUTION INTRAVENOUS at 20:10

## 2021-09-22 RX ADMIN — EPHEDRINE SULFATE 25 MG: 50 INJECTION INTRAVENOUS at 16:03

## 2021-09-22 RX ADMIN — SODIUM CITRATE AND CITRIC ACID MONOHYDRATE 30 ML: 500; 334 SOLUTION ORAL at 15:01

## 2021-09-22 RX ADMIN — MORPHINE SULFATE 0.25 MG: 0.5 INJECTION, SOLUTION EPIDURAL; INTRATHECAL; INTRAVENOUS at 15:56

## 2021-09-22 RX ADMIN — EPHEDRINE SULFATE 10 MG: 50 INJECTION INTRAVENOUS at 16:21

## 2021-09-22 RX ADMIN — ONDANSETRON 4 MG: 2 INJECTION INTRAMUSCULAR; INTRAVENOUS at 16:29

## 2021-09-22 RX ADMIN — KETOROLAC TROMETHAMINE 30 MG: 30 INJECTION, SOLUTION INTRAMUSCULAR; INTRAVENOUS at 16:27

## 2021-09-22 NOTE — ANESTHESIA PREPROCEDURE EVALUATION
Anesthesia Evaluation     Patient summary reviewed and Nursing notes reviewed                Airway   Mallampati: I  TM distance: >3 FB  Neck ROM: full  No difficulty expected  Dental      Pulmonary - negative pulmonary ROS and normal exam    breath sounds clear to auscultation  Cardiovascular - negative cardio ROS and normal exam    Rhythm: regular        Neuro/Psych- negative ROS  GI/Hepatic/Renal/Endo    (+) morbid obesity, GERD,      Musculoskeletal (-) negative ROS    Abdominal    Substance History - negative use     OB/GYN    (+) Pregnant,         Other                        Anesthesia Plan    ASA 3     spinal     intravenous induction     Anesthetic plan, all risks, benefits, and alternatives have been provided, discussed and informed consent has been obtained with: patient.

## 2021-09-22 NOTE — ANESTHESIA PROCEDURE NOTES
Spinal Block    Pre-sedation assessment completed: 9/22/2021 3:56 PM    Patient reassessed immediately prior to procedure    Patient location during procedure: OB  Start Time: 9/22/2021 3:46 PM  Stop Time: 9/22/2021 3:56 PM  Performed By  Anesthesiologist: Dmitriy Dela Cruz MD  Preanesthetic Checklist  Completed: patient identified, IV checked, site marked, risks and benefits discussed, surgical consent, monitors and equipment checked, pre-op evaluation and timeout performed  Spinal Block Prep:  Patient Position:sitting  Sterile Tech:cap, gloves, gown, mask and sterile barriers  Prep:Chloraprep  Patient Monitoring:blood pressure monitoring, continuous pulse oximetry and EKG  Spinal Block Procedure  Approach:midline  Location:L3-L4  Needle Type:Della  Needle Gauge:25 G  Placement of Spinal needle event:cerebrospinal fluid aspirated  Paresthesia: no  Fluid Appearance:clear  Medications: morphine PF (DURAMORPH) injection, 0.25 mg  bupivacaine PF (MARCAINE) injection 0.75%, 1.6 mL   Post Assessment  Patient Tolerance:patient tolerated the procedure well with no apparent complications  Complications no

## 2021-09-23 LAB
BASOPHILS # BLD AUTO: 0.02 10*3/MM3 (ref 0–0.2)
BASOPHILS NFR BLD AUTO: 0.2 % (ref 0–1.5)
DEPRECATED RDW RBC AUTO: 44.5 FL (ref 37–54)
EOSINOPHIL # BLD AUTO: 0.01 10*3/MM3 (ref 0–0.4)
EOSINOPHIL NFR BLD AUTO: 0.1 % (ref 0.3–6.2)
ERYTHROCYTE [DISTWIDTH] IN BLOOD BY AUTOMATED COUNT: 13.9 % (ref 12.3–15.4)
HCT VFR BLD AUTO: 30.1 % (ref 34–46.6)
HGB BLD-MCNC: 10.4 G/DL (ref 12–15.9)
IMM GRANULOCYTES # BLD AUTO: 0.05 10*3/MM3 (ref 0–0.05)
IMM GRANULOCYTES NFR BLD AUTO: 0.6 % (ref 0–0.5)
LYMPHOCYTES # BLD AUTO: 1.05 10*3/MM3 (ref 0.7–3.1)
LYMPHOCYTES NFR BLD AUTO: 12.7 % (ref 19.6–45.3)
MCH RBC QN AUTO: 30.3 PG (ref 26.6–33)
MCHC RBC AUTO-ENTMCNC: 34.6 G/DL (ref 31.5–35.7)
MCV RBC AUTO: 87.8 FL (ref 79–97)
MONOCYTES # BLD AUTO: 0.46 10*3/MM3 (ref 0.1–0.9)
MONOCYTES NFR BLD AUTO: 5.6 % (ref 5–12)
NEUTROPHILS NFR BLD AUTO: 6.66 10*3/MM3 (ref 1.7–7)
NEUTROPHILS NFR BLD AUTO: 80.8 % (ref 42.7–76)
NRBC BLD AUTO-RTO: 0 /100 WBC (ref 0–0.2)
PLATELET # BLD AUTO: 184 10*3/MM3 (ref 140–450)
PMV BLD AUTO: 10.5 FL (ref 6–12)
RBC # BLD AUTO: 3.43 10*6/MM3 (ref 3.77–5.28)
WBC # BLD AUTO: 8.25 10*3/MM3 (ref 3.4–10.8)

## 2021-09-23 PROCEDURE — 25010000002 KETOROLAC TROMETHAMINE PER 15 MG: Performed by: OBSTETRICS & GYNECOLOGY

## 2021-09-23 PROCEDURE — 85025 COMPLETE CBC W/AUTO DIFF WBC: CPT | Performed by: OBSTETRICS & GYNECOLOGY

## 2021-09-23 RX ORDER — DIPHENHYDRAMINE HCL 25 MG
25 CAPSULE ORAL EVERY 6 HOURS PRN
Status: DISCONTINUED | OUTPATIENT
Start: 2021-09-23 | End: 2021-09-24 | Stop reason: HOSPADM

## 2021-09-23 RX ORDER — FAMOTIDINE 20 MG/1
20 TABLET, FILM COATED ORAL 2 TIMES DAILY PRN
Status: DISCONTINUED | OUTPATIENT
Start: 2021-09-23 | End: 2021-09-24 | Stop reason: HOSPADM

## 2021-09-23 RX ADMIN — ACETAMINOPHEN 650 MG: 325 TABLET, FILM COATED ORAL at 20:40

## 2021-09-23 RX ADMIN — ACETAMINOPHEN 650 MG: 325 TABLET, FILM COATED ORAL at 02:58

## 2021-09-23 RX ADMIN — KETOROLAC TROMETHAMINE 30 MG: 30 INJECTION, SOLUTION INTRAMUSCULAR; INTRAVENOUS at 06:31

## 2021-09-23 RX ADMIN — ACETAMINOPHEN 650 MG: 325 TABLET, FILM COATED ORAL at 14:56

## 2021-09-23 RX ADMIN — SIMETHICONE 80 MG: 80 TABLET, CHEWABLE ORAL at 20:41

## 2021-09-23 RX ADMIN — IBUPROFEN 800 MG: 800 TABLET, FILM COATED ORAL at 23:29

## 2021-09-23 RX ADMIN — FAMOTIDINE 20 MG: 20 TABLET ORAL at 09:33

## 2021-09-23 RX ADMIN — DOCUSATE SODIUM 100 MG: 100 CAPSULE, LIQUID FILLED ORAL at 08:20

## 2021-09-23 RX ADMIN — KETOROLAC TROMETHAMINE 30 MG: 30 INJECTION, SOLUTION INTRAMUSCULAR; INTRAVENOUS at 01:20

## 2021-09-23 RX ADMIN — KETOROLAC TROMETHAMINE 30 MG: 30 INJECTION, SOLUTION INTRAMUSCULAR; INTRAVENOUS at 13:17

## 2021-09-23 RX ADMIN — HYDROCODONE BITARTRATE AND ACETAMINOPHEN 1 TABLET: 10; 325 TABLET ORAL at 21:33

## 2021-09-23 RX ADMIN — ACETAMINOPHEN 650 MG: 325 TABLET, FILM COATED ORAL at 08:20

## 2021-09-23 RX ADMIN — SODIUM CHLORIDE, POTASSIUM CHLORIDE, SODIUM LACTATE AND CALCIUM CHLORIDE 150 ML/HR: 600; 310; 30; 20 INJECTION, SOLUTION INTRAVENOUS at 06:30

## 2021-09-23 NOTE — ANESTHESIA POSTPROCEDURE EVALUATION
Patient: Nevin Huerta    Procedure Summary     Date: 21 Room / Location: [facility] LABOR DELIV[facility]  [facility] LAB[facility]    Anesthesia Start:  Anesthesia Stop:     Procedure:  SECTION REPEAT WITH TUBAL (Bilateral Abdomen) Diagnosis:     Surgeons: Jeanie Rojas DO Provider: Dmitriy Dela Cruz MD    Anesthesia Type: spinal ASA Status: 3          Anesthesia Type: spinal    Vitals  Vitals Value Taken Time   /55 21 1500   Temp 37 °C (98.6 °F) 21 1500   Pulse 88 21 1500   Resp 18 21 1500   SpO2             Post Anesthesia Care and Evaluation    Patient location during evaluation: bedside  Patient participation: complete - patient participated  Level of consciousness: awake  Pain score: 0  Pain management: adequate  Airway patency: patent  Anesthetic complications: No anesthetic complications  PONV Status: none  Cardiovascular status: acceptable and stable  Respiratory status: acceptable and room air  Hydration status: acceptable  Post Neuraxial Block status: Motor and sensory function returned to baseline and No signs or symptoms of PDPH

## 2021-09-24 VITALS
RESPIRATION RATE: 18 BRPM | WEIGHT: 252 LBS | HEIGHT: 63 IN | BODY MASS INDEX: 44.65 KG/M2 | TEMPERATURE: 97.5 F | HEART RATE: 80 BPM | SYSTOLIC BLOOD PRESSURE: 122 MMHG | DIASTOLIC BLOOD PRESSURE: 68 MMHG | OXYGEN SATURATION: 98 %

## 2021-09-24 RX ORDER — IBUPROFEN 800 MG/1
800 TABLET ORAL EVERY 8 HOURS PRN
Qty: 30 TABLET | Refills: 1 | Status: SHIPPED | OUTPATIENT
Start: 2021-09-24 | End: 2021-10-04

## 2021-09-24 RX ORDER — HYDROCODONE BITARTRATE AND ACETAMINOPHEN 5; 325 MG/1; MG/1
1-2 TABLET ORAL EVERY 6 HOURS PRN
Qty: 20 TABLET | Refills: 0 | Status: SHIPPED | OUTPATIENT
Start: 2021-09-24 | End: 2021-10-04

## 2021-09-24 RX ORDER — ACETAMINOPHEN 325 MG/1
650 TABLET ORAL EVERY 6 HOURS PRN
Qty: 30 TABLET | Refills: 1 | Status: SHIPPED | OUTPATIENT
Start: 2021-09-24 | End: 2021-10-04

## 2021-09-24 RX ADMIN — SIMETHICONE 80 MG: 80 TABLET, CHEWABLE ORAL at 09:11

## 2021-09-24 RX ADMIN — ACETAMINOPHEN 650 MG: 325 TABLET, FILM COATED ORAL at 04:19

## 2021-09-24 RX ADMIN — HYDROCODONE BITARTRATE AND ACETAMINOPHEN 1 TABLET: 10; 325 TABLET ORAL at 15:11

## 2021-09-24 RX ADMIN — HYDROCODONE BITARTRATE AND ACETAMINOPHEN 1 TABLET: 10; 325 TABLET ORAL at 08:55

## 2021-09-24 RX ADMIN — IBUPROFEN 800 MG: 800 TABLET, FILM COATED ORAL at 15:11

## 2021-09-24 RX ADMIN — DOCUSATE SODIUM 100 MG: 100 CAPSULE, LIQUID FILLED ORAL at 08:55

## 2021-09-24 RX ADMIN — HYDROCODONE BITARTRATE AND ACETAMINOPHEN 1 TABLET: 5; 325 TABLET ORAL at 04:26

## 2021-09-24 RX ADMIN — IBUPROFEN 800 MG: 800 TABLET, FILM COATED ORAL at 08:55

## 2021-10-27 PROBLEM — Z30.09 UNWANTED FERTILITY: Status: RESOLVED | Noted: 2021-09-21 | Resolved: 2021-10-27

## 2021-10-27 PROBLEM — K21.9 GASTROESOPHAGEAL REFLUX DISEASE WITHOUT ESOPHAGITIS: Status: RESOLVED | Noted: 2021-09-21 | Resolved: 2021-10-27

## 2021-10-29 ENCOUNTER — POSTPARTUM VISIT (OUTPATIENT)
Dept: OBSTETRICS AND GYNECOLOGY | Facility: CLINIC | Age: 36
End: 2021-10-29

## 2021-10-29 VITALS — BODY MASS INDEX: 40.74 KG/M2 | WEIGHT: 230 LBS | DIASTOLIC BLOOD PRESSURE: 80 MMHG | SYSTOLIC BLOOD PRESSURE: 118 MMHG

## 2021-10-29 PROCEDURE — 0503F POSTPARTUM CARE VISIT: CPT | Performed by: OBSTETRICS & GYNECOLOGY

## 2021-10-29 NOTE — PROGRESS NOTES
POSTPARTUM Follow Up Visit    CC: Postpartum FU  Chief Complaint   Patient presents with   • Postpartum Care       HPI:      • Antepartum or Postpartum complications: Elevated BMI- pre-pregnancy, Prior   • Date of delivery: 2021  • Delivery type:   BTL [place]          Perineum: NA  • Delivering Provider:   Dr. Jeanie Rojas      • Feeding: Bottle  • Pain:  No  • Vaginal Bleeding:  Yes spotting  • EPDS score: 10  • Plans for BC:  Tubal ligation PERFORMED AT [facility]  • Weight at last OB OV:  252  Last Completed Pap Smear          PAP SMEAR (Every 3 Years) Next due on 2020  Done              PHYSICAL EXAM:  /80   Wt 104 kg (230 lb)   LMP 2020 (LMP Unknown)   BMI 40.74 kg/m²  19.5 kg (43 lb)  General- NAD, alert and oriented, appropriate  Psych- Normal mood, good memory    INCISION: Clean, dry, intact, no evidence of infection  Abdomen- Soft, non distended, non tender, no masses    External genitalia- Normal.    Urethra/Bladder/Vagina- Normal, no masses, non-tender, no prolapse     Cvx- Normal, no lesions, no discharge, no CMT  Uterus- Normal size, shape & consistency.  Non tender, mobile, & no prolapse  Adnexa- Normal, no mass, non-tender    Lymphatic- No palpable groin nodes  Ext- No edema, no cyanosis   Skin- No lesions, no rashes, no acanthosis nigricans      ASSESSMENT AND PLAN:  Diagnoses and all orders for this visit:    1. Postpartum follow-up (Primary)        Counseling:    • May resume intercourse  • May resume normal activities  • Core strengthening exercises reviewed and recommended  • Kegel exercises reviewed and recommended  • Ok to return to work/school    Follow Up:  Return in about 8 months (around 2022) for WWE.          Jeanie Rojas, DO  10/29/2021    [facility]  [address] [place] [address] [address] [place] [address]  Dept: [phone]  Dept Fax: [phone]  Loc: [phone]  Loc Fax: [phone]

## 2022-03-02 ENCOUNTER — OFFICE VISIT (OUTPATIENT)
Dept: OBSTETRICS AND GYNECOLOGY | Facility: CLINIC | Age: 37
End: 2022-03-02

## 2022-03-02 VITALS
BODY MASS INDEX: 38.32 KG/M2 | HEART RATE: 85 BPM | WEIGHT: 230 LBS | SYSTOLIC BLOOD PRESSURE: 127 MMHG | DIASTOLIC BLOOD PRESSURE: 81 MMHG | HEIGHT: 65 IN

## 2022-03-02 DIAGNOSIS — Z80.3 FAMILY HISTORY OF BREAST CANCER: ICD-10-CM

## 2022-03-02 DIAGNOSIS — Z01.419 WELL WOMAN EXAM: Primary | ICD-10-CM

## 2022-03-02 DIAGNOSIS — E66.01 MORBID OBESITY WITH BMI OF 40.0-44.9, ADULT: ICD-10-CM

## 2022-03-02 PROBLEM — O36.8390 NON-REASSURING ELECTRONIC FETAL MONITORING TRACING: Status: RESOLVED | Noted: 2021-09-22 | Resolved: 2022-03-02

## 2022-03-02 PROCEDURE — G0123 SCREEN CERV/VAG THIN LAYER: HCPCS | Performed by: OBSTETRICS & GYNECOLOGY

## 2022-03-02 PROCEDURE — 99395 PREV VISIT EST AGE 18-39: CPT | Performed by: OBSTETRICS & GYNECOLOGY

## 2022-03-02 NOTE — PROGRESS NOTES
"Well Woman Visit    CC: WWE       HPI:   36 y.o. menses has been regular. Still awaiting menses this month. Baby is doing well. Not breastfeeding.  No concerns.    History: PMHx, Meds, Allergies, PSHx, Social Hx, and POBHx all reviewed and updated.    /81   Pulse 85   Ht 165.1 cm (65\")   Wt 104 kg (230 lb)   LMP 2022 (Exact Date)   Breastfeeding No Comment: Tubal  BMI 38.27 kg/m²     Physical Exam  Vitals and nursing note reviewed. Exam conducted with a chaperone present.   Constitutional:       General: She is not in acute distress.     Appearance: Normal appearance. She is not ill-appearing.   HENT:      Head: Normocephalic and atraumatic.      Mouth/Throat:      Mouth: Mucous membranes are moist.      Pharynx: Oropharynx is clear.   Eyes:      Extraocular Movements: Extraocular movements intact.   Neck:      Thyroid: No thyroid mass or thyromegaly.   Cardiovascular:      Rate and Rhythm: Regular rhythm.      Heart sounds: No murmur heard.      Pulmonary:      Effort: Pulmonary effort is normal.      Breath sounds: No wheezing.   Chest:   Breasts:      Right: No mass, nipple discharge or tenderness.      Left: No mass, nipple discharge or tenderness.       Abdominal:      General: Abdomen is flat. There is no distension.      Palpations: Abdomen is soft. There is no mass.      Tenderness: There is no abdominal tenderness. There is no guarding or rebound.      Hernia: No hernia is present. There is no hernia in the left inguinal area or right inguinal area.   Genitourinary:     General: Normal vulva.      Exam position: Lithotomy position.      Pubic Area: No rash.       Labia:         Right: No rash, tenderness, lesion or injury.         Left: No rash, tenderness, lesion or injury.       Urethra: No prolapse, urethral pain, urethral swelling or urethral lesion.      Vagina: Normal. No signs of injury and foreign body. No vaginal discharge, erythema, tenderness, bleeding, lesions or " prolapsed vaginal walls.      Cervix: Normal.      Uterus: Normal.       Adnexa: Right adnexa normal and left adnexa normal.   Musculoskeletal:      Right lower leg: No edema.      Left lower leg: No edema.   Skin:     General: Skin is warm and dry.      Findings: No rash.   Neurological:      Mental Status: She is alert and oriented to person, place, and time.   Psychiatric:         Mood and Affect: Mood normal.         Behavior: Behavior normal.         Thought Content: Thought content normal.         Judgment: Judgment normal.         ASSESSMENT AND PLAN:    Diagnoses and all orders for this visit:    1. Well woman exam (Primary)  Assessment & Plan:  Recommend daily multivitamin folic acid  The patient has had sterilization for birth control    Orders:  -     IGP,rfx Aptima HPV All Pth    2. Family history of breast cancer  Overview:  Mother diagnosed at age 45  Myriad genetic testing negative for patient    Orders:  -     Mammo Screening Digital Tomosynthesis Bilateral With CAD; Future    3. Morbid obesity with BMI of 40.0-44.9, adult (HCC)  Assessment & Plan:  Discussed exercise, nutrition recommended weight loss.      Preventative:   MMG  Myriad: Done and negative  Recommend FLU vaccine this season, R/B discussed  Recommend COVID vaccine, R/B discussed    She understands the importance of having any ordered tests to be performed in a timely fashion.  The risks of not performing them include, but are not limited to, advanced cancer stages, bone loss from osteoporosis and/or subsequent increase in morbidity and/or mortality.  She is encouraged to review her results online and/or contact or office if she has questions.     Follow Up:  Return for Annual physical.    Tim Stanton MD  03/02/2022

## 2022-03-07 LAB
CONV .: NORMAL
CYTOLOGIST CVX/VAG CYTO: NORMAL
CYTOLOGY CVX/VAG DOC CYTO: NORMAL
CYTOLOGY CVX/VAG DOC THIN PREP: NORMAL
DX ICD CODE: NORMAL
HIV 1 & 2 AB SER-IMP: NORMAL
OTHER STN SPEC: NORMAL
STAT OF ADQ CVX/VAG CYTO-IMP: NORMAL

## 2022-05-02 ENCOUNTER — HOSPITAL ENCOUNTER (OUTPATIENT)
Dept: MAMMOGRAPHY | Facility: HOSPITAL | Age: 37
Discharge: HOME | End: 2022-05-02
Admitting: OBSTETRICS & GYNECOLOGY

## 2022-05-02 DIAGNOSIS — Z80.3 FAMILY HISTORY OF BREAST CANCER: ICD-10-CM

## 2022-05-02 PROCEDURE — 77063 BREAST TOMOSYNTHESIS BI: CPT

## 2022-05-02 PROCEDURE — 77067 SCR MAMMO BI INCL CAD: CPT

## 2023-03-08 ENCOUNTER — OFFICE VISIT (OUTPATIENT)
Dept: OBSTETRICS AND GYNECOLOGY | Facility: CLINIC | Age: 38
End: 2023-03-08
Payer: COMMERCIAL

## 2023-03-08 VITALS
BODY MASS INDEX: 35.65 KG/M2 | HEIGHT: 65 IN | WEIGHT: 214 LBS | SYSTOLIC BLOOD PRESSURE: 116 MMHG | HEART RATE: 94 BPM | DIASTOLIC BLOOD PRESSURE: 69 MMHG

## 2023-03-08 DIAGNOSIS — Z01.419 WELL WOMAN EXAM: ICD-10-CM

## 2023-03-08 DIAGNOSIS — Z01.419 WOMEN'S ANNUAL ROUTINE GYNECOLOGICAL EXAMINATION: Primary | ICD-10-CM

## 2023-03-08 DIAGNOSIS — Z80.3 FAMILY HISTORY OF BREAST CANCER: ICD-10-CM

## 2023-03-08 PROBLEM — E66.01 MORBID OBESITY WITH BMI OF 40.0-44.9, ADULT (HCC): Status: RESOLVED | Noted: 2021-09-22 | Resolved: 2023-03-08

## 2023-03-08 PROBLEM — Z98.891 H/O: C-SECTION: Status: RESOLVED | Noted: 2021-09-22 | Resolved: 2023-03-08

## 2023-03-08 PROCEDURE — G0123 SCREEN CERV/VAG THIN LAYER: HCPCS | Performed by: OBSTETRICS & GYNECOLOGY

## 2023-03-08 PROCEDURE — 99395 PREV VISIT EST AGE 18-39: CPT | Performed by: OBSTETRICS & GYNECOLOGY

## 2023-03-08 NOTE — PROGRESS NOTES
"Well Woman Visit    CC: CAROLINA    HPI:   37 y.o. who presents for a well woman exam.  The patient reports that her last 2 cycles of been abnormal.  She reports having a very heavy and longer flow in January.  She then skipped February.  She has not had a menstrual cycle in March yet.  No other problems or concerns.    History: PMHx, Meds, Allergies, PSHx, Social Hx, and POBHx all reviewed and updated.    /69   Pulse 94   Ht 165.1 cm (65\")   Wt 97.1 kg (214 lb)   LMP 2022   Breastfeeding No   BMI 35.61 kg/m²     Physical Exam  Vitals and nursing note reviewed. Exam conducted with a chaperone present.   Constitutional:       General: She is not in acute distress.     Appearance: Normal appearance. She is obese. She is not ill-appearing.   Neck:      Thyroid: No thyroid mass or thyromegaly.   Chest:   Breasts:     Breasts are symmetrical.      Right: Normal. No swelling, bleeding, inverted nipple, mass, nipple discharge, skin change or tenderness.      Left: Normal. No swelling, bleeding, inverted nipple, mass, nipple discharge, skin change or tenderness.   Abdominal:      General: Abdomen is flat. There is no distension.      Palpations: Abdomen is soft. There is no mass.      Tenderness: There is no abdominal tenderness. There is no guarding or rebound.      Hernia: No hernia is present. There is no hernia in the left inguinal area or right inguinal area.   Genitourinary:     General: Normal vulva.      Exam position: Lithotomy position.      Pubic Area: No rash.       Labia:         Right: No rash, tenderness, lesion or injury.         Left: No rash, tenderness, lesion or injury.       Urethra: No prolapse, urethral pain, urethral swelling or urethral lesion.      Vagina: Normal. No vaginal discharge, tenderness or prolapsed vaginal walls.      Cervix: Normal.      Uterus: Normal.       Adnexa: Right adnexa normal and left adnexa normal.   Lymphadenopathy:      Upper Body:      Right upper " body: No supraclavicular or axillary adenopathy.      Left upper body: No supraclavicular or axillary adenopathy.   Skin:     General: Skin is warm and dry.   Neurological:      Mental Status: She is alert and oriented to person, place, and time.   Psychiatric:         Mood and Affect: Mood normal.         Behavior: Behavior normal.         Thought Content: Thought content normal.         ASSESSMENT AND PLAN:    Diagnoses and all orders for this visit:    1. Women's annual routine gynecological examination (Primary)  -     IGP,rfx Aptima HPV All Pth  -     Mammo Screening Bilateral With CAD; Future    2. Well woman exam  Assessment & Plan:  Pap  Recommend daily multivitamin with folic acid      3. Family history of breast cancer  Overview:  Mother diagnosed at age 45  Myriad genetic testing negative for patient    Assessment & Plan:  Continue with yearly screening mammograms        Counseling:     Track menses, RTO IF <q21d, >7d long, or heavy    Preventative:   MMG  Recommend FLU vaccine this season, R/B discussed  Recommend COVID vaccine, R/B discussed    She understands the importance of having any ordered tests to be performed in a timely fashion.  The risks of not performing them include, but are not limited to, advanced cancer stages, bone loss from osteoporosis and/or subsequent increase in morbidity and/or mortality.  She is encouraged to review her results online and/or contact or office if she has questions.     Follow Up:  Return for Annual physical.    Tim Stanton MD  03/08/2023

## 2023-06-14 ENCOUNTER — HOSPITAL ENCOUNTER (OUTPATIENT)
Dept: MAMMOGRAPHY | Facility: HOSPITAL | Age: 38
Discharge: HOME | End: 2023-06-14
Admitting: OBSTETRICS & GYNECOLOGY
Payer: COMMERCIAL

## 2023-06-14 DIAGNOSIS — Z01.419 WOMEN'S ANNUAL ROUTINE GYNECOLOGICAL EXAMINATION: ICD-10-CM

## 2023-06-14 PROCEDURE — 77067 SCR MAMMO BI INCL CAD: CPT

## 2023-06-14 PROCEDURE — 77063 BREAST TOMOSYNTHESIS BI: CPT

## 2024-03-13 ENCOUNTER — OFFICE VISIT (OUTPATIENT)
Dept: OBSTETRICS AND GYNECOLOGY | Facility: CLINIC | Age: 39
End: 2024-03-13
Payer: COMMERCIAL

## 2024-03-13 VITALS
HEART RATE: 77 BPM | BODY MASS INDEX: 41.22 KG/M2 | DIASTOLIC BLOOD PRESSURE: 64 MMHG | WEIGHT: 224 LBS | HEIGHT: 62 IN | SYSTOLIC BLOOD PRESSURE: 115 MMHG

## 2024-03-13 DIAGNOSIS — Z80.3 FAMILY HISTORY OF BREAST CANCER: ICD-10-CM

## 2024-03-13 DIAGNOSIS — Z01.419 WOMEN'S ANNUAL ROUTINE GYNECOLOGICAL EXAMINATION: Primary | ICD-10-CM

## 2024-03-13 PROCEDURE — 99395 PREV VISIT EST AGE 18-39: CPT | Performed by: OBSTETRICS & GYNECOLOGY

## 2024-03-13 PROCEDURE — G0123 SCREEN CERV/VAG THIN LAYER: HCPCS | Performed by: OBSTETRICS & GYNECOLOGY

## 2024-03-13 NOTE — PROGRESS NOTES
"Well Woman Visit    CC: CAROLINA     HPI:   38 y.o. who presents for a well woman exam. Menses is monthly, lasting 5-7 days. Heavier flow. No concerns today    History: PMHx, Meds, Allergies, PSHx, Social Hx, and POBHx all reviewed and updated.    /64   Pulse 77   Ht 157.5 cm (62\")   Wt 102 kg (224 lb)   LMP 2024 (Exact Date)   BMI 40.97 kg/m²     Physical Exam  Vitals and nursing note reviewed. Exam conducted with a chaperone present.   Constitutional:       General: She is not in acute distress.     Appearance: Normal appearance. She is obese. She is not ill-appearing.   Neck:      Thyroid: No thyroid mass or thyromegaly.   Chest:   Breasts:     Breasts are symmetrical.      Right: Normal. No swelling, bleeding, inverted nipple, mass, nipple discharge, skin change or tenderness.      Left: Normal. No swelling, bleeding, inverted nipple, mass, nipple discharge, skin change or tenderness.   Abdominal:      General: Abdomen is flat. There is no distension.      Palpations: Abdomen is soft. There is no mass.      Tenderness: There is no abdominal tenderness.      Hernia: There is no hernia in the left inguinal area or right inguinal area.   Genitourinary:     General: Normal vulva.      Exam position: Lithotomy position.      Pubic Area: No rash.       Labia:         Right: No rash, tenderness, lesion or injury.         Left: No rash, tenderness, lesion or injury.       Urethra: No prolapse, urethral pain or urethral lesion.      Vagina: Normal. No vaginal discharge, tenderness or prolapsed vaginal walls.      Cervix: No friability, lesion, erythema or cervical bleeding.      Uterus: Normal. Not deviated, not enlarged, not fixed and not tender.       Adnexa:         Right: No mass or tenderness.          Left: No mass or tenderness.     Musculoskeletal:      Right lower leg: No edema.      Left lower leg: No edema.   Lymphadenopathy:      Upper Body:      Right upper body: No supraclavicular or " axillary adenopathy.      Left upper body: No supraclavicular or axillary adenopathy.   Skin:     General: Skin is warm and dry.   Neurological:      Mental Status: She is alert and oriented to person, place, and time.   Psychiatric:         Mood and Affect: Mood normal.         Behavior: Behavior normal.         Thought Content: Thought content normal.         ASSESSMENT AND PLAN:    Diagnoses and all orders for this visit:    1. Women's annual routine gynecological examination (Primary)  Assessment & Plan:  Pap  Recommend daily multivitamin with folic acid    Orders:  -     IGP,rfx Aptima HPV All Pth    2. Family history of breast cancer  Overview:  Mother diagnosed at age 45  Myriad genetic testing negative for patient    Orders:  -     Mammo Screening Digital Tomosynthesis Bilateral With CAD; Future        Counseling:     Track menses, RTO IF <q21d, >7d long, or heavy    Preventative:   MMG  Recommend FLU vaccine this season, R/B discussed  Recommend COVID vaccine, R/B discussed    She understands the importance of having any ordered tests to be performed in a timely fashion.  The risks of not performing them include, but are not limited to, advanced cancer stages, bone loss from osteoporosis and/or subsequent increase in morbidity and/or mortality.  She is encouraged to review her results online and/or contact or office if she has questions.     Follow Up:  Return for Annual physical.    Tim Stanton MD  03/13/2024

## 2024-03-18 LAB
CONV .: NORMAL
CYTOLOGIST CVX/VAG CYTO: NORMAL
CYTOLOGY CVX/VAG DOC CYTO: NORMAL
CYTOLOGY CVX/VAG DOC THIN PREP: NORMAL
DX ICD CODE: NORMAL
Lab: NORMAL
OTHER STN SPEC: NORMAL
STAT OF ADQ CVX/VAG CYTO-IMP: NORMAL

## 2024-06-12 NOTE — SIGNIFICANT NOTE
35w5d  /64 (BP Location: Right arm, Patient Position: Sitting)   Pulse 82   Temp 98.7 °F (37.1 °C) (Oral)   Resp 18   SpO2 99%        09/11/21 1459   Nonstress Test   Reason for NST Other (Comment)  (AMA)   Uterine Irritability No   Contractions   (OCCASIONAL)   Fetal Assessment   Fetal Movement active   Fetal HR Assessment Method external   Fetal HR (beats/min) 145   Fetal Heart Baseline Rate normal range   Fetal HR Variability moderate (amplitude range 6 to 25 bpm)   Fetal HR Accelerations greater than/equal to 15 bpm;lasting at least 15 seconds   Interpretation A   Nonstress Test Interpretation A Reactive     
spouse

## 2024-06-18 ENCOUNTER — HOSPITAL ENCOUNTER (OUTPATIENT)
Dept: MAMMOGRAPHY | Facility: HOSPITAL | Age: 39
Discharge: HOME | End: 2024-06-18
Admitting: OBSTETRICS & GYNECOLOGY
Payer: COMMERCIAL

## 2024-06-18 DIAGNOSIS — Z80.3 FAMILY HISTORY OF BREAST CANCER: ICD-10-CM

## 2024-06-18 DIAGNOSIS — R92.8 ABNORMAL MAMMOGRAM: Primary | ICD-10-CM

## 2024-06-18 PROCEDURE — 77063 BREAST TOMOSYNTHESIS BI: CPT

## 2024-06-18 PROCEDURE — 77067 SCR MAMMO BI INCL CAD: CPT

## 2024-06-19 ENCOUNTER — TELEPHONE (OUTPATIENT)
Dept: OBSTETRICS AND GYNECOLOGY | Facility: CLINIC | Age: 39
End: 2024-06-19
Payer: COMMERCIAL

## 2024-07-12 ENCOUNTER — HOSPITAL ENCOUNTER (OUTPATIENT)
Dept: ULTRASOUND IMAGING | Facility: HOSPITAL | Age: 39
Discharge: HOME | End: 2024-07-12
Payer: COMMERCIAL

## 2024-07-12 ENCOUNTER — HOSPITAL ENCOUNTER (OUTPATIENT)
Dept: MAMMOGRAPHY | Facility: HOSPITAL | Age: 39
Discharge: HOME | End: 2024-07-12
Payer: COMMERCIAL

## 2024-07-12 DIAGNOSIS — R92.8 ABNORMAL MAMMOGRAM: ICD-10-CM

## 2024-07-12 PROCEDURE — 76642 ULTRASOUND BREAST LIMITED: CPT

## 2024-07-12 PROCEDURE — G0279 TOMOSYNTHESIS, MAMMO: HCPCS

## 2024-07-12 PROCEDURE — 77066 DX MAMMO INCL CAD BI: CPT

## 2024-07-12 PROCEDURE — 77065 DX MAMMO INCL CAD UNI: CPT

## (undated) DEVICE — Device: Brand: PORTEX

## (undated) DEVICE — GLV SURG BIOGEL LTX PF 7

## (undated) DEVICE — TRY CATH FOL ADVANCE SIL W/BAG 16F

## (undated) DEVICE — INTENDED FOR TISSUE SEPARATION, AND OTHER PROCEDURES THAT REQUIRE A SHARP SURGICAL BLADE TO PUNCTURE OR CUT.: Brand: BARD-PARKER ® CARBON RIB-BACK BLADES

## (undated) DEVICE — STERILE POLYISOPRENE POWDER-FREE SURGICAL GLOVES WITH EMOLLIENT COATING: Brand: PROTEXIS

## (undated) DEVICE — NEEDLE,18GX1.5",REG,BEVEL: Brand: MEDLINE

## (undated) DEVICE — DEV TRANSF BLD W/LUER ADPT CA/198

## (undated) DEVICE — SUT VIC 3/0 CTI 36IN J944H

## (undated) DEVICE — PAD GRND REM POLYHESIVE A/ DISP

## (undated) DEVICE — SUT MNCRYL 4/0 PS2 18 IN

## (undated) DEVICE — SUT MNCRYL 0 CT1 27IN VIL

## (undated) DEVICE — C SECTION PACK: Brand: MEDLINE INDUSTRIES, INC.

## (undated) DEVICE — SUT MNCRYL 0/0 CTX 36IN Y398H

## (undated) DEVICE — CVR HNDL LT SURG ACCSSRY BLU STRL

## (undated) DEVICE — SUT CHRM 0 CT1 36IN 924H

## (undated) DEVICE — VIOLET BRAIDED (POLYGLACTIN 910), SYNTHETIC ABSORBABLE SUTURE: Brand: COATED VICRYL